# Patient Record
Sex: MALE | Race: OTHER | NOT HISPANIC OR LATINO | URBAN - METROPOLITAN AREA
[De-identification: names, ages, dates, MRNs, and addresses within clinical notes are randomized per-mention and may not be internally consistent; named-entity substitution may affect disease eponyms.]

---

## 2022-02-09 ENCOUNTER — EMERGENCY (EMERGENCY)
Facility: HOSPITAL | Age: 27
LOS: 0 days | Discharge: HOME | End: 2022-02-09
Attending: EMERGENCY MEDICINE | Admitting: EMERGENCY MEDICINE
Payer: COMMERCIAL

## 2022-02-09 VITALS
HEART RATE: 85 BPM | DIASTOLIC BLOOD PRESSURE: 71 MMHG | OXYGEN SATURATION: 99 % | SYSTOLIC BLOOD PRESSURE: 122 MMHG | RESPIRATION RATE: 16 BRPM

## 2022-02-09 VITALS
HEART RATE: 92 BPM | HEIGHT: 69 IN | TEMPERATURE: 97 F | SYSTOLIC BLOOD PRESSURE: 138 MMHG | DIASTOLIC BLOOD PRESSURE: 86 MMHG | WEIGHT: 179.9 LBS | RESPIRATION RATE: 18 BRPM | OXYGEN SATURATION: 99 %

## 2022-02-09 DIAGNOSIS — F43.20 ADJUSTMENT DISORDER, UNSPECIFIED: ICD-10-CM

## 2022-02-09 DIAGNOSIS — Z04.6 ENCOUNTER FOR GENERAL PSYCHIATRIC EXAMINATION, REQUESTED BY AUTHORITY: ICD-10-CM

## 2022-02-09 DIAGNOSIS — Z20.822 CONTACT WITH AND (SUSPECTED) EXPOSURE TO COVID-19: ICD-10-CM

## 2022-02-09 DIAGNOSIS — F32.9 MAJOR DEPRESSIVE DISORDER, SINGLE EPISODE, UNSPECIFIED: ICD-10-CM

## 2022-02-09 LAB
ALBUMIN SERPL ELPH-MCNC: 4.7 G/DL — SIGNIFICANT CHANGE UP (ref 3.5–5.2)
ALP SERPL-CCNC: 95 U/L — SIGNIFICANT CHANGE UP (ref 30–115)
ALT FLD-CCNC: 55 U/L — HIGH (ref 0–41)
ANION GAP SERPL CALC-SCNC: 10 MMOL/L — SIGNIFICANT CHANGE UP (ref 7–14)
APAP SERPL-MCNC: <5 UG/ML — LOW (ref 10–30)
APPEARANCE UR: CLEAR — SIGNIFICANT CHANGE UP
AST SERPL-CCNC: 30 U/L — SIGNIFICANT CHANGE UP (ref 0–41)
BASOPHILS # BLD AUTO: 0.02 K/UL — SIGNIFICANT CHANGE UP (ref 0–0.2)
BASOPHILS NFR BLD AUTO: 0.4 % — SIGNIFICANT CHANGE UP (ref 0–1)
BILIRUB SERPL-MCNC: 0.2 MG/DL — SIGNIFICANT CHANGE UP (ref 0.2–1.2)
BILIRUB UR-MCNC: NEGATIVE — SIGNIFICANT CHANGE UP
BUN SERPL-MCNC: 11 MG/DL — SIGNIFICANT CHANGE UP (ref 10–20)
CALCIUM SERPL-MCNC: 9.6 MG/DL — SIGNIFICANT CHANGE UP (ref 8.5–10.1)
CHLORIDE SERPL-SCNC: 104 MMOL/L — SIGNIFICANT CHANGE UP (ref 98–110)
CO2 SERPL-SCNC: 26 MMOL/L — SIGNIFICANT CHANGE UP (ref 17–32)
COLOR SPEC: YELLOW — SIGNIFICANT CHANGE UP
CREAT SERPL-MCNC: 1 MG/DL — SIGNIFICANT CHANGE UP (ref 0.7–1.5)
DIFF PNL FLD: NEGATIVE — SIGNIFICANT CHANGE UP
EOSINOPHIL # BLD AUTO: 0.13 K/UL — SIGNIFICANT CHANGE UP (ref 0–0.7)
EOSINOPHIL NFR BLD AUTO: 2.4 % — SIGNIFICANT CHANGE UP (ref 0–8)
ETHANOL SERPL-MCNC: <10 MG/DL — SIGNIFICANT CHANGE UP
GLUCOSE SERPL-MCNC: 90 MG/DL — SIGNIFICANT CHANGE UP (ref 70–99)
GLUCOSE UR QL: NEGATIVE MG/DL — SIGNIFICANT CHANGE UP
HCT VFR BLD CALC: 45.5 % — SIGNIFICANT CHANGE UP (ref 42–52)
HGB BLD-MCNC: 15.2 G/DL — SIGNIFICANT CHANGE UP (ref 14–18)
IMM GRANULOCYTES NFR BLD AUTO: 0.2 % — SIGNIFICANT CHANGE UP (ref 0.1–0.3)
KETONES UR-MCNC: NEGATIVE — SIGNIFICANT CHANGE UP
LEUKOCYTE ESTERASE UR-ACNC: NEGATIVE — SIGNIFICANT CHANGE UP
LYMPHOCYTES # BLD AUTO: 1.92 K/UL — SIGNIFICANT CHANGE UP (ref 1.2–3.4)
LYMPHOCYTES # BLD AUTO: 36 % — SIGNIFICANT CHANGE UP (ref 20.5–51.1)
MCHC RBC-ENTMCNC: 29.1 PG — SIGNIFICANT CHANGE UP (ref 27–31)
MCHC RBC-ENTMCNC: 33.4 G/DL — SIGNIFICANT CHANGE UP (ref 32–37)
MCV RBC AUTO: 87.2 FL — SIGNIFICANT CHANGE UP (ref 80–94)
MONOCYTES # BLD AUTO: 0.62 K/UL — HIGH (ref 0.1–0.6)
MONOCYTES NFR BLD AUTO: 11.6 % — HIGH (ref 1.7–9.3)
NEUTROPHILS # BLD AUTO: 2.63 K/UL — SIGNIFICANT CHANGE UP (ref 1.4–6.5)
NEUTROPHILS NFR BLD AUTO: 49.4 % — SIGNIFICANT CHANGE UP (ref 42.2–75.2)
NITRITE UR-MCNC: NEGATIVE — SIGNIFICANT CHANGE UP
NRBC # BLD: 0 /100 WBCS — SIGNIFICANT CHANGE UP (ref 0–0)
PH UR: 6.5 — SIGNIFICANT CHANGE UP (ref 5–8)
PLATELET # BLD AUTO: 264 K/UL — SIGNIFICANT CHANGE UP (ref 130–400)
POTASSIUM SERPL-MCNC: 4.5 MMOL/L — SIGNIFICANT CHANGE UP (ref 3.5–5)
POTASSIUM SERPL-SCNC: 4.5 MMOL/L — SIGNIFICANT CHANGE UP (ref 3.5–5)
PROT SERPL-MCNC: 7.5 G/DL — SIGNIFICANT CHANGE UP (ref 6–8)
PROT UR-MCNC: NEGATIVE MG/DL — SIGNIFICANT CHANGE UP
RBC # BLD: 5.22 M/UL — SIGNIFICANT CHANGE UP (ref 4.7–6.1)
RBC # FLD: 11.9 % — SIGNIFICANT CHANGE UP (ref 11.5–14.5)
SALICYLATES SERPL-MCNC: <0.3 MG/DL — LOW (ref 4–30)
SARS-COV-2 RNA SPEC QL NAA+PROBE: SIGNIFICANT CHANGE UP
SODIUM SERPL-SCNC: 140 MMOL/L — SIGNIFICANT CHANGE UP (ref 135–146)
SP GR SPEC: >=1.03 (ref 1.01–1.03)
UROBILINOGEN FLD QL: 0.2 MG/DL — SIGNIFICANT CHANGE UP
WBC # BLD: 5.33 K/UL — SIGNIFICANT CHANGE UP (ref 4.8–10.8)
WBC # FLD AUTO: 5.33 K/UL — SIGNIFICANT CHANGE UP (ref 4.8–10.8)

## 2022-02-09 PROCEDURE — 93010 ELECTROCARDIOGRAM REPORT: CPT

## 2022-02-09 PROCEDURE — 90792 PSYCH DIAG EVAL W/MED SRVCS: CPT | Mod: 95

## 2022-02-09 PROCEDURE — 99285 EMERGENCY DEPT VISIT HI MDM: CPT

## 2022-02-09 RX ORDER — HYDROXYZINE HCL 10 MG
1 TABLET ORAL
Qty: 10 | Refills: 0
Start: 2022-02-09 | End: 2022-02-18

## 2022-02-09 NOTE — ED PROVIDER NOTE - PROGRESS NOTE DETAILS
MATTHEW Schulz: I was directly involved in the management of this patient. Case was discussed with MATTHEW Swanson Spoke to Dr Torres in telepsych about pt. state he will be added to the list to be seen
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
## 2022-02-09 NOTE — ED PROVIDER NOTE - PATIENT PORTAL LINK FT
You can access the FollowMyHealth Patient Portal offered by [facility] by registering at the following website: [email or web address]. By joining [employer]’s FollowMyHealth portal, you will also be able to view your health information using other applications (apps) compatible with our system.

## 2022-02-09 NOTE — ED PROVIDER NOTE - NSFOLLOWUPINSTRUCTIONS_ED_ALL_ED_FT
Please follow up with referrals from telepsych. please take medications as prescribed    Depression    Depression is a mental illness that usually causes feelings of sadness, hopelessness, or helplessness. Some people with this disorder do not feel particularly sad but lose interest in doing things they used to enjoy (anhedonia). Major depressive disorder also can cause physical symptoms. It can interfere with work, school, relationships, and other normal everyday activities. If you were started on a medication make sure to take exactly as prescribed and follow up with a psychiatrist.    SEEK IMMEDIATE MEDICAL CARE IF YOU HAVE THE FOLLOWING SYMPTOMS: thoughts about hurting killing yourself, thoughts about hurting or killing somebody else, hallucinations, or worsening depression.

## 2022-02-09 NOTE — ED PROVIDER NOTE - PHYSICAL EXAMINATION
VITAL SIGNS: I have reviewed nursing notes and confirm.  CONSTITUTIONAL: Well-developed; well-nourished; in no acute distress.  SKIN: Skin exam is warm and dry, no acute rash.  HEAD: Normocephalic; atraumatic.  EYES: EOM intact; conjunctiva and sclera clear.  ENT: No nasal discharge; airway clear.   NECK: Supple; non tender.  CARD: S1, S2 normal; no murmurs, gallops, or rubs. Regular rate and rhythm.  RESP: No wheezes, rales or rhonchi. Speaking in full sentences.   ABD:  soft; non-distended; non-tender; No rebound or guarding.  EXT: Normal ROM. No clubbing, cyanosis or edema.  NEURO: Alert, oriented  PSYCH: Cooperative, appropriate.

## 2022-02-09 NOTE — ED PROVIDER NOTE - NSFOLLOWUPCLINICS_GEN_ALL_ED_FT
Advanced care planning was discussed with patient and family.  Advanced care planning forms were reviewed and discussed.  Risks, benefits and alternatives of gastroenterologic procedures were discussed in detail and all questions were answered.    30 minutes spent. [facility]  OP [facility]  [address] [place] [id number]  Phone: [phone]  Fax:   Follow Up Time: 1-3 Days
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
## 2022-02-09 NOTE — ED PROVIDER NOTE - NS ED ROS FT
Review of Systems  Constitutional:  No fever, chills, malaise, generalized weakness. denies FH of schizophrenia, depression, or bipolar  Eyes:  No visual changes, eye pain, or discharge.  Cardiac:  No chest pain, palpitations, syncope, or edema.  Respiratory:  No dyspnea  GI:  No nausea, vomiting, diarrhea, or abdominal pain.   :  No dysuria,  Skin:  No skin rash, pruritis, jaundice, or lesions.  Neuro:  + headache, - dizziness Review of Systems  Constitutional:  No fever, chills, malaise, generalized weakness. denies FH of schizophrenia, depression, or bipolar  Eyes:  No visual changes, eye pain, or discharge.  Cardiac:  No chest pain, palpitations, syncope, or edema.  Respiratory:  No dyspnea  GI:  No nausea, vomiting, diarrhea, or abdominal pain.   :  No dysuria,  Skin:  No skin rash, pruritis, jaundice, or lesions.  Neuro:  + headache, - dizziness  PSYCH: + SI

## 2022-02-09 NOTE — ED BEHAVIORAL HEALTH ASSESSMENT NOTE - RISK ASSESSMENT
chronic rf: none  acute rf: marital stressors, poor sleep, appetite, depressive symptoms  protective f: no pphx, support, no SA hx Low Acute Suicide Risk
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
## 2022-02-09 NOTE — ED ADULT NURSE NOTE - NSIMPLEMENTINTERV_GEN_ALL_ED
Implemented All Universal Safety Interventions:  [place] to call system. Call bell, personal items and telephone within reach. Instruct patient to call for assistance. Room bathroom lighting operational. Non-slip footwear when patient is off stretcher. Physically safe environment: no spills, clutter or unnecessary equipment. Stretcher in lowest position, wheels locked, appropriate side rails in place.

## 2022-02-09 NOTE — ED BEHAVIORAL HEALTH ASSESSMENT NOTE - SUMMARY
25 yo male,  approximately 1.5yrs, originally from [place], [place], fully employed as a [employer] kir[employer] , non-caregiver/no children, no PMHx, no PPHx, no hx of inpatient psychiatric hospitalizations, no hx of si/hi, no hx of substance abuse, no hx of legal issues, no hx of DV, presents to the ED with poor sleep, appetite, unable to work in the setting of marital issues. Upon assessment today, patient appears to endorse some depressive symptoms including poor sleep, appetite, inability to work and marital stressors. Patient denies any SI/HI/AVH patient does not meet criteria for inpatient psych admission. will be provided with referrals for establishing outpatient psych care.

## 2022-02-09 NOTE — ED PROVIDER NOTE - CLINICAL SUMMARY MEDICAL DECISION MAKING FREE TEXT BOX
Patient medically cleared for psych evaluation. Patient seen by telemetry psych and deemed safe for discharge home. Patient given Rx for Vistaril. will follow-up with outpatient mental health.

## 2022-02-09 NOTE — ED ADULT NURSE REASSESSMENT NOTE - NS ED NURSE REASSESS COMMENT FT1
Pt alert and oriented X4 discharge in stable condition accompanied by friend, no suicidal thoughts, pt clean and comfortable with eye contact.

## 2022-02-24 PROBLEM — Z78.9 OTHER SPECIFIED HEALTH STATUS: Chronic | Status: ACTIVE | Noted: 2022-02-09

## 2022-03-08 ENCOUNTER — OUTPATIENT (OUTPATIENT)
Dept: OUTPATIENT SERVICES | Facility: HOSPITAL | Age: 27
LOS: 1 days | Discharge: HOME | End: 2022-03-08

## 2022-03-08 ENCOUNTER — APPOINTMENT (OUTPATIENT)
Dept: PSYCHIATRY | Facility: CLINIC | Age: 27
End: 2022-03-08

## 2022-03-08 DIAGNOSIS — F43.23 ADJUSTMENT DISORDER WITH MIXED ANXIETY AND DEPRESSED MOOD: ICD-10-CM

## 2022-03-08 PROBLEM — Z00.00 ENCOUNTER FOR PREVENTIVE HEALTH EXAMINATION: Status: ACTIVE | Noted: 2022-03-08

## 2022-03-25 ENCOUNTER — APPOINTMENT (OUTPATIENT)
Dept: PSYCHIATRY | Facility: CLINIC | Age: 27
End: 2022-03-25
Payer: COMMERCIAL

## 2022-03-25 ENCOUNTER — OUTPATIENT (OUTPATIENT)
Dept: OUTPATIENT SERVICES | Facility: HOSPITAL | Age: 27
LOS: 1 days | Discharge: HOME | End: 2022-03-25

## 2022-03-25 VITALS — WEIGHT: 196.63 LBS | HEIGHT: 69 IN | BODY MASS INDEX: 29.12 KG/M2

## 2022-03-25 DIAGNOSIS — F41.1 GENERALIZED ANXIETY DISORDER: ICD-10-CM

## 2022-03-25 DIAGNOSIS — F32.1 MAJOR DEPRESSIVE DISORDER, SINGLE EPISODE, MODERATE: ICD-10-CM

## 2022-03-25 PROCEDURE — 90792 PSYCH DIAG EVAL W/MED SRVCS: CPT

## 2022-04-01 ENCOUNTER — NON-APPOINTMENT (OUTPATIENT)
Age: 27
End: 2022-04-01

## 2022-04-07 VITALS — WEIGHT: 190 LBS | HEIGHT: 69 IN | BODY MASS INDEX: 28.14 KG/M2

## 2022-04-08 ENCOUNTER — OUTPATIENT (OUTPATIENT)
Dept: OUTPATIENT SERVICES | Facility: HOSPITAL | Age: 27
LOS: 1 days | Discharge: HOME | End: 2022-04-08

## 2022-04-08 ENCOUNTER — APPOINTMENT (OUTPATIENT)
Dept: PSYCHIATRY | Facility: CLINIC | Age: 27
End: 2022-04-08
Payer: COMMERCIAL

## 2022-04-08 VITALS — WEIGHT: 194 LBS | HEIGHT: 69 IN | BODY MASS INDEX: 28.73 KG/M2

## 2022-04-08 DIAGNOSIS — F32.2 MAJOR DEPRESSIVE DISORDER, SINGLE EPISODE, SEVERE WITHOUT PSYCHOTIC FEATURES: ICD-10-CM

## 2022-04-08 PROCEDURE — 99213 OFFICE O/P EST LOW 20 MIN: CPT

## 2022-04-08 RX ORDER — MIRTAZAPINE 7.5 MG/1
7.5 TABLET, FILM COATED ORAL AT BEDTIME
Qty: 30 | Refills: 0 | Status: DISCONTINUED | COMMUNITY
Start: 2022-03-25 | End: 2022-04-08

## 2022-04-12 ENCOUNTER — APPOINTMENT (OUTPATIENT)
Dept: PSYCHIATRY | Facility: CLINIC | Age: 27
End: 2022-04-12

## 2022-04-12 ENCOUNTER — OUTPATIENT (OUTPATIENT)
Dept: OUTPATIENT SERVICES | Facility: HOSPITAL | Age: 27
LOS: 1 days | Discharge: HOME | End: 2022-04-12

## 2022-04-12 DIAGNOSIS — F32.2 MAJOR DEPRESSIVE DISORDER, SINGLE EPISODE, SEVERE WITHOUT PSYCHOTIC FEATURES: ICD-10-CM

## 2022-04-25 ENCOUNTER — OUTPATIENT (OUTPATIENT)
Dept: OUTPATIENT SERVICES | Facility: HOSPITAL | Age: 27
LOS: 1 days | Discharge: HOME | End: 2022-04-25

## 2022-04-25 ENCOUNTER — APPOINTMENT (OUTPATIENT)
Dept: PSYCHIATRY | Facility: CLINIC | Age: 27
End: 2022-04-25

## 2022-04-25 DIAGNOSIS — F32.2 MAJOR DEPRESSIVE DISORDER, SINGLE EPISODE, SEVERE WITHOUT PSYCHOTIC FEATURES: ICD-10-CM

## 2022-04-29 ENCOUNTER — OUTPATIENT (OUTPATIENT)
Dept: OUTPATIENT SERVICES | Facility: HOSPITAL | Age: 27
LOS: 1 days | Discharge: HOME | End: 2022-04-29

## 2022-04-29 ENCOUNTER — APPOINTMENT (OUTPATIENT)
Dept: PSYCHIATRY | Facility: CLINIC | Age: 27
End: 2022-04-29
Payer: COMMERCIAL

## 2022-04-29 VITALS — BODY MASS INDEX: 27.97 KG/M2 | WEIGHT: 188.88 LBS | HEIGHT: 69 IN

## 2022-04-29 DIAGNOSIS — F32.2 MAJOR DEPRESSIVE DISORDER, SINGLE EPISODE, SEVERE WITHOUT PSYCHOTIC FEATURES: ICD-10-CM

## 2022-04-29 PROCEDURE — 99213 OFFICE O/P EST LOW 20 MIN: CPT

## 2022-05-09 ENCOUNTER — APPOINTMENT (OUTPATIENT)
Dept: PSYCHIATRY | Facility: CLINIC | Age: 27
End: 2022-05-09

## 2022-05-17 ENCOUNTER — OUTPATIENT (OUTPATIENT)
Dept: OUTPATIENT SERVICES | Facility: HOSPITAL | Age: 27
LOS: 1 days | Discharge: HOME | End: 2022-05-17

## 2022-05-17 ENCOUNTER — APPOINTMENT (OUTPATIENT)
Dept: PSYCHIATRY | Facility: CLINIC | Age: 27
End: 2022-05-17
Payer: COMMERCIAL

## 2022-05-17 VITALS — WEIGHT: 187.63 LBS | HEIGHT: 69 IN | BODY MASS INDEX: 27.79 KG/M2

## 2022-05-17 DIAGNOSIS — F32.2 MAJOR DEPRESSIVE DISORDER, SINGLE EPISODE, SEVERE WITHOUT PSYCHOTIC FEATURES: ICD-10-CM

## 2022-05-17 PROCEDURE — 99213 OFFICE O/P EST LOW 20 MIN: CPT

## 2022-05-23 ENCOUNTER — OUTPATIENT (OUTPATIENT)
Dept: OUTPATIENT SERVICES | Facility: HOSPITAL | Age: 27
LOS: 1 days | Discharge: HOME | End: 2022-05-23

## 2022-05-23 ENCOUNTER — APPOINTMENT (OUTPATIENT)
Dept: PSYCHIATRY | Facility: CLINIC | Age: 27
End: 2022-05-23

## 2022-05-23 DIAGNOSIS — F32.2 MAJOR DEPRESSIVE DISORDER, SINGLE EPISODE, SEVERE WITHOUT PSYCHOTIC FEATURES: ICD-10-CM

## 2022-06-08 ENCOUNTER — APPOINTMENT (OUTPATIENT)
Dept: PSYCHIATRY | Facility: CLINIC | Age: 27
End: 2022-06-08
Payer: COMMERCIAL

## 2022-06-08 ENCOUNTER — OUTPATIENT (OUTPATIENT)
Dept: OUTPATIENT SERVICES | Facility: HOSPITAL | Age: 27
LOS: 1 days | Discharge: HOME | End: 2022-06-08

## 2022-06-08 VITALS — WEIGHT: 185.75 LBS | HEIGHT: 69 IN | BODY MASS INDEX: 27.51 KG/M2

## 2022-06-08 DIAGNOSIS — F32.2 MAJOR DEPRESSIVE DISORDER, SINGLE EPISODE, SEVERE WITHOUT PSYCHOTIC FEATURES: ICD-10-CM

## 2022-06-08 PROCEDURE — 99213 OFFICE O/P EST LOW 20 MIN: CPT

## 2022-06-24 ENCOUNTER — APPOINTMENT (OUTPATIENT)
Dept: PSYCHIATRY | Facility: CLINIC | Age: 27
End: 2022-06-24

## 2022-07-08 ENCOUNTER — APPOINTMENT (OUTPATIENT)
Dept: PSYCHIATRY | Facility: CLINIC | Age: 27
End: 2022-07-08

## 2022-07-08 ENCOUNTER — OUTPATIENT (OUTPATIENT)
Dept: OUTPATIENT SERVICES | Facility: HOSPITAL | Age: 27
LOS: 1 days | Discharge: HOME | End: 2022-07-08

## 2022-07-08 VITALS — WEIGHT: 180.75 LBS | HEIGHT: 69 IN | BODY MASS INDEX: 26.77 KG/M2

## 2022-07-08 DIAGNOSIS — F32.2 MAJOR DEPRESSIVE DISORDER, SINGLE EPISODE, SEVERE WITHOUT PSYCHOTIC FEATURES: ICD-10-CM

## 2022-07-08 PROCEDURE — 99213 OFFICE O/P EST LOW 20 MIN: CPT

## 2022-07-08 RX ORDER — ATORVASTATIN CALCIUM 20 MG/1
20 TABLET, FILM COATED ORAL
Qty: 30 | Refills: 0 | Status: ACTIVE | COMMUNITY
Start: 2022-06-30

## 2022-08-05 ENCOUNTER — APPOINTMENT (OUTPATIENT)
Dept: PSYCHIATRY | Facility: CLINIC | Age: 27
End: 2022-08-05

## 2022-08-05 ENCOUNTER — OUTPATIENT (OUTPATIENT)
Dept: OUTPATIENT SERVICES | Facility: HOSPITAL | Age: 27
LOS: 1 days | Discharge: HOME | End: 2022-08-05

## 2022-08-05 VITALS — HEIGHT: 69 IN | BODY MASS INDEX: 25.82 KG/M2 | WEIGHT: 174.31 LBS

## 2022-08-05 DIAGNOSIS — F32.2 MAJOR DEPRESSIVE DISORDER, SINGLE EPISODE, SEVERE WITHOUT PSYCHOTIC FEATURES: ICD-10-CM

## 2022-08-05 PROCEDURE — 99213 OFFICE O/P EST LOW 20 MIN: CPT

## 2022-09-23 ENCOUNTER — OUTPATIENT (OUTPATIENT)
Dept: OUTPATIENT SERVICES | Facility: HOSPITAL | Age: 27
LOS: 1 days | Discharge: HOME | End: 2022-09-23

## 2022-09-23 ENCOUNTER — APPOINTMENT (OUTPATIENT)
Dept: PSYCHIATRY | Facility: CLINIC | Age: 27
End: 2022-09-23

## 2022-09-23 DIAGNOSIS — F32.2 MAJOR DEPRESSIVE DISORDER, SINGLE EPISODE, SEVERE WITHOUT PSYCHOTIC FEATURES: ICD-10-CM

## 2022-10-04 ENCOUNTER — APPOINTMENT (OUTPATIENT)
Dept: PSYCHIATRY | Facility: CLINIC | Age: 27
End: 2022-10-04

## 2022-10-04 ENCOUNTER — OUTPATIENT (OUTPATIENT)
Dept: OUTPATIENT SERVICES | Facility: HOSPITAL | Age: 27
LOS: 1 days | Discharge: HOME | End: 2022-10-04

## 2022-10-04 DIAGNOSIS — F32.2 MAJOR DEPRESSIVE DISORDER, SINGLE EPISODE, SEVERE WITHOUT PSYCHOTIC FEATURES: ICD-10-CM

## 2022-10-04 DIAGNOSIS — F51.05 INSOMNIA DUE TO OTHER MENTAL DISORDER: ICD-10-CM

## 2022-10-04 DIAGNOSIS — F43.22 ADJUSTMENT DISORDER WITH ANXIETY: ICD-10-CM

## 2022-10-04 PROCEDURE — 99214 OFFICE O/P EST MOD 30 MIN: CPT

## 2022-10-04 NOTE — REVIEW OF SYSTEMS
[Negative] : Allergic/Immunologic [FreeTextEntry2] : fatigue [FreeTextEntry7] : occasional diarrhea [de-identified] : occasional headaches

## 2022-10-04 NOTE — RESULTS/DATA
[FreeTextEntry1] : 4/16/22\par \par FBS 96\par HBA1C 5.5\par \par Bun 23, otherwise CMP WNL\par \par TSH 1.2\par \par Chol 235\par Tri  396\par HDL 27\par \par \par B12 208\par \par Elevated cholesterol and low B12 discussed with patient. .  He was provided a copy to bring to PMD.  He reports prior history of statin treatment.

## 2022-10-04 NOTE — PHYSICAL EXAM
[None] : none [Appropriate] : appropriate [Oriented] : oriented [Normal] : good [Adeq for basic needs] : adequate for basic needs [Anxious] : anxious [FreeTextEntry1] : neatly groomed, well-dressed, anxious [FreeTextEntry7] : transient passive  suicidal ideation [FreeTextEntry8] : less anxious, slightly brighter [de-identified] : impaired
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
## 2022-11-14 ENCOUNTER — APPOINTMENT (OUTPATIENT)
Dept: PSYCHIATRY | Facility: CLINIC | Age: 27
End: 2022-11-14

## 2022-11-14 NOTE — DISCUSSION/SUMMARY
[FreeTextEntry1] : Pt is a no show.\par I called him. He thought that his appt is at 2pm (not at 12pm). Pt doesn't know how to use telepsych and can do only in person visits. Pt will call our  to reschedule

## 2022-11-25 ENCOUNTER — APPOINTMENT (OUTPATIENT)
Dept: PSYCHIATRY | Facility: CLINIC | Age: 27
End: 2022-11-25

## 2022-11-25 ENCOUNTER — OUTPATIENT (OUTPATIENT)
Dept: OUTPATIENT SERVICES | Facility: HOSPITAL | Age: 27
LOS: 1 days | Discharge: HOME | End: 2022-11-25

## 2022-11-25 DIAGNOSIS — F51.05 INSOMNIA DUE TO OTHER MENTAL DISORDER: ICD-10-CM

## 2022-11-25 DIAGNOSIS — F43.22 ADJUSTMENT DISORDER WITH ANXIETY: ICD-10-CM

## 2022-11-25 DIAGNOSIS — F32.2 MAJOR DEPRESSIVE DISORDER, SINGLE EPISODE, SEVERE WITHOUT PSYCHOTIC FEATURES: ICD-10-CM

## 2022-11-25 PROCEDURE — 99214 OFFICE O/P EST MOD 30 MIN: CPT

## 2022-11-25 NOTE — REVIEW OF SYSTEMS
[Negative] : Allergic/Immunologic [FreeTextEntry2] : fatigue [FreeTextEntry7] : occasional diarrhea [de-identified] : occasional headaches
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
## 2022-11-25 NOTE — HISTORY OF PRESENT ILLNESS
[de-identified] : Pt came in person for his visit.\par Pt says that he is doing better and meds work. Tolerates them well. Pt didn't have any psych history before his problems with wife (cheating on him, separation, etc)started. Pt still lives with his friends in [place]. 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 Spoke again about his ex. \par We also spoke about his symptoms. Pt denies AH at this time. However, he acknowledges to having AH sometimes (his ex's voice, talking to him). He feels better, though still misses her at times. Denies SI/HI/AH/PI. Denies drugs and ETOH. C/w psychotherapy was encouraged. I emailed Fatoumata-pt would benefit a lot. \par Pt is going to see his PCP and to have labs done again (he has mild ALT and LDL elevation). Healthy life style and diet were d/w pt. The risks of zyprexa were also discussed. I will not change meds this time. I gave pt my card with our fax #. \par \par  [No] : no [Yes] : yes [None Known] : none known [Mood episode] : mood episode [Recent humiliation/shame] : recent humiliation/shame [Responsibility to family or others] : responsibility to family or others [Supportive social network or family] : supportive social network or family [High spirituality] : high spirituality [Other___] : [unfilled]

## 2022-11-25 NOTE — PHYSICAL EXAM
[None] : none [Anxious] : anxious [Appropriate] : appropriate [Oriented] : oriented [Normal] : good [Adeq for basic needs] : adequate for basic needs [FreeTextEntry1] : neatly groomed, well-dressed, anxious [FreeTextEntry7] : transient passive  suicidal ideation [FreeTextEntry8] : less anxious, slightly brighter [de-identified] : impaired
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
## 2022-11-28 ENCOUNTER — NON-APPOINTMENT (OUTPATIENT)
Age: 27
End: 2022-11-28

## 2022-12-09 ENCOUNTER — OUTPATIENT (OUTPATIENT)
Dept: OUTPATIENT SERVICES | Facility: HOSPITAL | Age: 27
LOS: 1 days | Discharge: HOME | End: 2022-12-09

## 2022-12-09 ENCOUNTER — NON-APPOINTMENT (OUTPATIENT)
Age: 27
End: 2022-12-09

## 2022-12-09 ENCOUNTER — APPOINTMENT (OUTPATIENT)
Dept: PSYCHIATRY | Facility: CLINIC | Age: 27
End: 2022-12-09

## 2022-12-09 DIAGNOSIS — F43.22 ADJUSTMENT DISORDER WITH ANXIETY: ICD-10-CM

## 2022-12-09 DIAGNOSIS — F32.2 MAJOR DEPRESSIVE DISORDER, SINGLE EPISODE, SEVERE WITHOUT PSYCHOTIC FEATURES: ICD-10-CM

## 2022-12-09 DIAGNOSIS — F51.05 INSOMNIA DUE TO OTHER MENTAL DISORDER: ICD-10-CM

## 2022-12-09 NOTE — REASON FOR VISIT
[Patient] : Patient [FreeTextEntry1] : Patient is a 27 year old male being seen for a followup therapy session

## 2022-12-09 NOTE — PLAN
[Supportive Therapy] : Supportive Therapy [FreeTextEntry2] : Goal #1 Reduce depressive symptoms so patient can return to effective functioning. \par \par Obj#1 New.. Patient will engage in physical activity and exercise daily. \par Obj#2 Revised.. Patient will use daily positive coping and distraction methods. . \par  [de-identified] : Patient reports that his psychiatrist is referring him back to therapy since his mood has been a little low related to upcoming anniversary he proposed to his wife and one year anniversary of her leaving him. He denies any self-harm thoughts but sad thoughts of what happened and loss of his marriage which interfere with his sleep. He reports medication compliance and that there have not been any changes in his medication since last session. He reports going for blood work and will be following up with PCP. He will speak to PCP about medication for sleep.  He reports that he has still be living in [place] with family friends. He plans to visit family update for the holidays. He continues to work with  who is helping him with divorce process and immigration benefits. He reports that he has been functioning at work and feels good to be working but it has been slow at his job which is not helpful for his mood and fiances. Explored positive coping methods. He reports listening to music and reading is helpful to distract his sad thoughts. He has not been consistent with exercise since he likes outdoor exercise and it has been cold but he will make effort to go out for walks or runs. He would like to followup with next session on the same day of his appointment with psychiatrist,after the holidays, on 1/6/23.  [Recommended Frequency of Visits: ____] : Recommended frequency of visits: [unfilled] [Return in ____ week(s)] : Return in [unfilled] week(s) [FreeTextEntry1] : Patient will focus on positive coping methods, continue medication regimen, followup PCP, and followup with psychiatrist and therapy on 1/6/23

## 2023-01-04 ENCOUNTER — NON-APPOINTMENT (OUTPATIENT)
Age: 28
End: 2023-01-04

## 2023-01-06 ENCOUNTER — APPOINTMENT (OUTPATIENT)
Dept: PSYCHIATRY | Facility: CLINIC | Age: 28
End: 2023-01-06

## 2023-01-23 ENCOUNTER — OUTPATIENT (OUTPATIENT)
Dept: OUTPATIENT SERVICES | Facility: HOSPITAL | Age: 28
LOS: 1 days | Discharge: HOME | End: 2023-01-23

## 2023-01-23 ENCOUNTER — APPOINTMENT (OUTPATIENT)
Dept: PSYCHIATRY | Facility: CLINIC | Age: 28
End: 2023-01-23

## 2023-01-23 DIAGNOSIS — F43.22 ADJUSTMENT DISORDER WITH ANXIETY: ICD-10-CM

## 2023-01-23 DIAGNOSIS — F32.2 MAJOR DEPRESSIVE DISORDER, SINGLE EPISODE, SEVERE WITHOUT PSYCHOTIC FEATURES: ICD-10-CM

## 2023-01-23 DIAGNOSIS — F51.05 INSOMNIA DUE TO OTHER MENTAL DISORDER: ICD-10-CM

## 2023-01-24 ENCOUNTER — OUTPATIENT (OUTPATIENT)
Dept: OUTPATIENT SERVICES | Facility: HOSPITAL | Age: 28
LOS: 1 days | Discharge: HOME | End: 2023-01-24

## 2023-01-24 ENCOUNTER — APPOINTMENT (OUTPATIENT)
Dept: PSYCHIATRY | Facility: CLINIC | Age: 28
End: 2023-01-24
Payer: COMMERCIAL

## 2023-01-24 DIAGNOSIS — F43.22 ADJUSTMENT DISORDER WITH ANXIETY: ICD-10-CM

## 2023-01-24 DIAGNOSIS — F51.05 INSOMNIA DUE TO OTHER MENTAL DISORDER: ICD-10-CM

## 2023-01-24 DIAGNOSIS — F32.2 MAJOR DEPRESSIVE DISORDER, SINGLE EPISODE, SEVERE WITHOUT PSYCHOTIC FEATURES: ICD-10-CM

## 2023-01-24 PROCEDURE — 99214 OFFICE O/P EST MOD 30 MIN: CPT

## 2023-01-24 NOTE — PHYSICAL EXAM
[None] : none [Anxious] : anxious [Appropriate] : appropriate [Oriented] : oriented [Normal] : good [Adeq for basic needs] : adequate for basic needs [FreeTextEntry1] : neatly groomed, well-dressed, anxious [FreeTextEntry7] : transient passive  suicidal ideation [FreeTextEntry8] : less anxious, slightly brighter [de-identified] : impaired

## 2023-01-24 NOTE — REVIEW OF SYSTEMS
[Negative] : Allergic/Immunologic [FreeTextEntry2] : fatigue [FreeTextEntry7] : occasional diarrhea [de-identified] : occasional headaches
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
## 2023-02-14 ENCOUNTER — NON-APPOINTMENT (OUTPATIENT)
Age: 28
End: 2023-02-14

## 2023-02-27 ENCOUNTER — APPOINTMENT (OUTPATIENT)
Dept: PSYCHIATRY | Facility: CLINIC | Age: 28
End: 2023-02-27
Payer: COMMERCIAL

## 2023-02-27 ENCOUNTER — OUTPATIENT (OUTPATIENT)
Dept: OUTPATIENT SERVICES | Facility: HOSPITAL | Age: 28
LOS: 1 days | End: 2023-02-27
Payer: COMMERCIAL

## 2023-02-27 ENCOUNTER — APPOINTMENT (OUTPATIENT)
Dept: PSYCHIATRY | Facility: CLINIC | Age: 28
End: 2023-02-27

## 2023-02-27 DIAGNOSIS — F60.9 PERSONALITY DISORDER, UNSPECIFIED: ICD-10-CM

## 2023-02-27 DIAGNOSIS — F33.1 MAJOR DEPRESSIVE DISORDER, RECURRENT, MODERATE: ICD-10-CM

## 2023-02-27 DIAGNOSIS — F51.05 INSOMNIA DUE TO OTHER MENTAL DISORDER: ICD-10-CM

## 2023-02-27 DIAGNOSIS — F41.1 GENERALIZED ANXIETY DISORDER: ICD-10-CM

## 2023-02-27 DIAGNOSIS — F32.2 MAJOR DEPRESSIVE DISORDER, SINGLE EPISODE, SEVERE WITHOUT PSYCHOTIC FEATURES: ICD-10-CM

## 2023-02-27 DIAGNOSIS — F43.22 ADJUSTMENT DISORDER WITH ANXIETY: ICD-10-CM

## 2023-02-27 PROCEDURE — 99214 OFFICE O/P EST MOD 30 MIN: CPT

## 2023-02-27 PROCEDURE — 99214 OFFICE O/P EST MOD 30 MIN: CPT | Mod: 25,95

## 2023-02-27 PROCEDURE — 90832 PSYTX W PT 30 MINUTES: CPT

## 2023-02-27 NOTE — REVIEW OF SYSTEMS
[Negative] : Allergic/Immunologic [FreeTextEntry2] : fatigue [FreeTextEntry7] : occasional diarrhea [de-identified] : occasional headaches

## 2023-02-27 NOTE — PHYSICAL EXAM
[None] : none [Anxious] : no anxious [Appropriate] : appropriate [Oriented] : oriented [Normal] : good [Adeq for basic needs] : adequate for basic needs [FreeTextEntry1] : neatly groomed, well-dressed, anxious [FreeTextEntry7] : transient passive  suicidal ideation [FreeTextEntry8] : less depressed [de-identified] : impaired

## 2023-02-27 NOTE — HISTORY OF PRESENT ILLNESS
[de-identified] : Pt came in person for his visit.\par Pt says that he is doing better and meds work. Tolerates them well. Feels better.Pt started working more, making more money and works hard. He is going to [place] today (will drive for 3 days and tolerates it well). Pt gained weight, but says that this doesn't bother him since he had lost more than 35 lbs due to severe depression. Pt already decreased zyprexa to 2.5mg and feels good. We will continue this dose for several more months and then will try to stop it.Pt sleeps well and doesn't have any perceptual disturbances.\par  Pt didn't have any psych history before his problems with wife (cheating on him, separation, etc)started.  Spoke again about his ex. \par  Denies SI/HI/AH/PI. Denies drugs and ETOH. C/w psychotherapy\par I received pt's labs (not scanned yet). Lipids are elevated. Healthy diet and life style were d/w pt.\par His citizenship case is coming along well. [No] : no [Yes] : yes [None Known] : none known [Mood episode] : mood episode [Recent humiliation/shame] : recent humiliation/shame [Responsibility to family or others] : responsibility to family or others [Supportive social network or family] : supportive social network or family [High spirituality] : high spirituality [Other___] : [unfilled]
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
## 2023-02-28 NOTE — REASON FOR VISIT
[Patient] : Patient [FreeTextEntry1] : Patient is a 27 year old make been for a followup therapy session, 11:15 to 11:35 AM. COVID screening is negative.

## 2023-02-28 NOTE — PLAN
[Supportive Therapy] : Supportive Therapy [Recommended Frequency of Visits: ____] : Recommended frequency of visits: [unfilled] [FreeTextEntry2] : Goal #1 Reduce depressive symptoms so patient can return to effective functioning. \par \par Obj#1  Patient will engage in physical activity and exercise daily. \par Obj#2  Patient will use daily positive coping and distraction methods. . \par  [de-identified] : Patient reports that he has gotten through the one year anniversary of his wife leaving. He reports that he is feeling much better and has made progress since last year. He reports that he saw his psychiatrist today and may be ready at next visit with her, on April 7, to start reduce his medication dosage. He reports that he has been active with work and exercise. He reports that he is sleeping well and that having a constructive daily routine helps his self-esteem and distract himself from any worry thoughts. He reports that, overall, his thoughts have been positive. He reports feeling well physically and still has a PCP in [place] whom he followup with at least once a year. He is reports functioning well and enjoying his job which requires him to travel with different states. He reports that he returned home from working 20 days, and will be leaving today to [place] for his job. He reports that he is still staying in [place] with family friends. He reports contact with his family in [place]. Renetta to difficulty with his work schedule and feeling stable, he does not need to continue therapy sessions at this time but will continue Medication management sessions with psychiatrist.  [FreeTextEntry1] : Patient will continue use of daily routine of constructive activity. He will continue medication regimen and followup with medication management on 4/7. He will return to therapy sessions in the future if needed.

## 2023-03-01 NOTE — DISCUSSION/SUMMARY
[Plan Review] : Plan Review [Able to exercise self-direction] : able to exercise self-direction [Able to set and pursue goals] : able to set and pursue goals [Adherent to treatment recommendations] : adherent to treatment recommendations [Articulate] : articulate [Attempting to realize their potential] : Attempting to realize their potential [Cognitively intact] : cognitively intact [Motivated to participate in treatment] : motivated to participate in treatment [Motivated to maintain or improve physical health] : motivated to maintain or improve physical health [In good health] : in good health [Has vocational interests or hobbies] : has vocational interests or hobbies [Part of a supportive family] : part of a supportive family [Steady employment] : steady employment [Connected to healthcare] : connected to healthcare [FreeTextEntry2] : 12/7/23 [FreeTextEntry3] : 3/25/22 [FreeTextEntry8] : None  [FreeTextEntry9] : None  [de-identified] : None  [Mental Health] : Mental Health [Revised - Rationale] : Revised - Rationale: [every ___ months] : every [unfilled] months [Treatment is no longer medically necessary as evidenced by:] : Treatment is no longer medically necessary as evidenced by: [None - Reason others did not participate:] : None - Reason others did not participate:  [Yes] : Yes [Psychiatric Provider/Prescriber] : Psychiatric Provider/Prescriber [Therapist] : Therapist [FreeTextEntry1] : Patient started treatment March of 2022 due to becoming very depressed after wife left him. His mood has been impacted by the upcoming one year anniversary of his wife leaving him.  [FreeTextEntry4] : " I want to get through the one year anniversary of my wife leaving"  " I want to return to effective functioning"  [de-identified] : Patient was been seen for medication management only since his depressive and anxiety symptoms had significantly improved but patient is now having some symptoms due to upcoming one year anniversary of wife leaving him.  [de-identified] : Patient will engage in physical activity and exercise daily.  [de-identified] : 12/7/23 [de-identified] : Patient has been working a job out of state. Patient reports that keeping active and having a daily structure of constructive activity has been helpful.  [de-identified] : Patient will use daily positive coping and distraction methods..  [de-identified] : 12/7/23 [de-identified] : Patient has been having more  thoughts related to loss of his relationship with wife causing some mood disturbance.  [FreeTextEntry5] : [place]  and Supportive therapy  [de-identified] : Due to job schedule and traveling out of state for work, patient has difficulty keeping with having sessions more than once a month.  [de-identified] : Patient can function at baseline without therapy or medication management, or patient can be seen by PCP or other provider in the community for medication management.   [de-identified] : Not clinically indicated

## 2023-03-01 NOTE — PLAN
From: Lilli Calvo  To: López Luevano,   Sent: 3/4/2020 1:56 PM CST  Subject: Lab Test or Test Related Question    I have a question about LIPID PANEL WITH REFLEX resulted on 2/17/20, 3:04 PM.    So what are the next steps that can / should be taken based on these numbers being above normal?    Lilli Calvo   [[facility]] : [facility]  [Supportive Therapy] : Supportive Therapy [Recommended Frequency of Visits: ____] : Recommended frequency of visits: [unfilled] [Return in ____ week(s)] : Return in [unfilled] week(s) [FreeTextEntry2] : Goal #1 Reduce depressive symptoms so patient can return to effective functioning. \par \par Obj#1  Patient will engage in physical activity and exercise daily. \par Obj#2  Patient will use daily positive coping and distraction methods. [de-identified] : Patient reports that he is overall doing better than when he started treatment. He reports that getting back to work has been helpful to keep him distracted of thoughts about his wife leaving him. He reports that January and February are difficultly months for him due to anniversary of getting ,wife's birthday, and wife leaving him. He reports still feeling sad related to the loss but denies any self-harm thoughts. He reports that he has not had any self-harm thoughts for awhile.He reports that he is sleeping well and has been engaging in exercise. He reports support from family friend and cousin. He reports that he is still working with  regarding his green card and other benefits He would like to visit parents in [place] whom he has not seen in a long time. He questioned how long he has to continue his antidepressant since he is feeling better and functioning better. He will discuss with psychiatrist tomorrow whether he can start to reduce medication dosage. Due to his work schedule and feeling better he would like to keep sessions once a month.  [FreeTextEntry1] : Patient will focus on positive coping methods, continue work activity, continue medication regimen, followup with psychiatrist, and followup with therapy on 2/27.

## 2023-03-01 NOTE — REASON FOR VISIT
[Patient] : Patient [FreeTextEntry1] : Patient is a 27 year old male being seen for a followup therapy session. COVID screening is negative.

## 2023-04-07 ENCOUNTER — APPOINTMENT (OUTPATIENT)
Dept: PSYCHIATRY | Facility: CLINIC | Age: 28
End: 2023-04-07
Payer: COMMERCIAL

## 2023-04-07 ENCOUNTER — OUTPATIENT (OUTPATIENT)
Dept: OUTPATIENT SERVICES | Facility: HOSPITAL | Age: 28
LOS: 1 days | End: 2023-04-07
Payer: COMMERCIAL

## 2023-04-07 DIAGNOSIS — F41.1 GENERALIZED ANXIETY DISORDER: ICD-10-CM

## 2023-04-07 DIAGNOSIS — F33.1 MAJOR DEPRESSIVE DISORDER, RECURRENT, MODERATE: ICD-10-CM

## 2023-04-07 DIAGNOSIS — F32.2 MAJOR DEPRESSIVE DISORDER, SINGLE EPISODE, SEVERE WITHOUT PSYCHOTIC FEATURES: ICD-10-CM

## 2023-04-07 PROCEDURE — 99214 OFFICE O/P EST MOD 30 MIN: CPT | Mod: 95

## 2023-04-07 PROCEDURE — 99214 OFFICE O/P EST MOD 30 MIN: CPT

## 2023-04-07 NOTE — REVIEW OF SYSTEMS
[Negative] : Allergic/Immunologic [FreeTextEntry2] : fatigue [FreeTextEntry7] : occasional diarrhea [de-identified] : occasional headaches
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
## 2023-04-07 NOTE — PHYSICAL EXAM
[None] : none [Anxious] : no anxious [Appropriate] : appropriate [Oriented] : oriented [Normal] : good [Adeq for basic needs] : adequate for basic needs [FreeTextEntry1] : neatly groomed, well-dressed, anxious [FreeTextEntry7] : transient passive  suicidal ideation [FreeTextEntry8] : less depressed [de-identified] : impaired

## 2023-04-07 NOTE — HISTORY OF PRESENT ILLNESS
[de-identified] : Pt came in person for his visit. 30mins\par Pt says that he is doing better and meds work. Tolerates them well. Feels better. However, sometimes, he feels very lonely and anxious.Work is slow and he doesn't make a lot of money, though he works hard. Spoke about his parents back in [place] and his childhood (he has been here for 10 y. His dad used to work in [place] and send money for his family, etc). Pt gained 10 lbs, but says that this doesn't bother him since he had lost more than 35 lbs due to severe depression. Pt already decreased zyprexa to 2.5mg and feels good. We will continue this dose for several more months and then will try to stop it.Pt sleeps well and doesn't have any perceptual disturbances.\par  Pt didn't have any psych history before his problems with wife (cheating on him, separation, etc)started.  Spoke again about his ex. \par  Denies SI/HI/AH/PI. Denies drugs and ETOH. C/w psychotherapy\par I received pt's labs (not scanned yet). Lipids are elevated. Healthy diet and life style were d/w pt.\par His citizenship case is coming along well.\par \par Pt is a 26 yo  male originally from [place] ([facility]) referred from [facility] where he presented due to poor sleep, severe anxiety, intrusive thoughts. dysphoria and transient suicidal thoughts which began in January after he discovered his wife of 2 years was cheating on him and threw him out of their home in [place].  He is currently staying with friends in [place], works as a   (drives to [place]). States that he feels very ashamed about his marital situation and has not told his family in [place] about it. He reports some episodes of anxiety with SOB, palpitations, headaches.He reports that he frequently thinks about the day his wife and new boyfriend ejected him from his home.\par Pt has no h/o IPP, no SA, no THERESE. Pt is applying for green card or citizenship. He has an . Pt has a job permit at this time. [No] : no [Yes] : yes [None Known] : none known [Mood episode] : mood episode [Recent humiliation/shame] : recent humiliation/shame [Responsibility to family or others] : responsibility to family or others [Supportive social network or family] : supportive social network or family [High spirituality] : high spirituality [Other___] : [unfilled]

## 2023-04-08 DIAGNOSIS — F32.2 MAJOR DEPRESSIVE DISORDER, SINGLE EPISODE, SEVERE WITHOUT PSYCHOTIC FEATURES: ICD-10-CM

## 2023-04-08 DIAGNOSIS — F41.1 GENERALIZED ANXIETY DISORDER: ICD-10-CM

## 2023-06-14 ENCOUNTER — OUTPATIENT (OUTPATIENT)
Dept: OUTPATIENT SERVICES | Facility: HOSPITAL | Age: 28
LOS: 1 days | End: 2023-06-14
Payer: COMMERCIAL

## 2023-06-14 ENCOUNTER — APPOINTMENT (OUTPATIENT)
Dept: PSYCHIATRY | Facility: CLINIC | Age: 28
End: 2023-06-14

## 2023-06-14 ENCOUNTER — APPOINTMENT (OUTPATIENT)
Dept: PSYCHIATRY | Facility: CLINIC | Age: 28
End: 2023-06-14
Payer: COMMERCIAL

## 2023-06-14 DIAGNOSIS — F33.1 MAJOR DEPRESSIVE DISORDER, RECURRENT, MODERATE: ICD-10-CM

## 2023-06-14 DIAGNOSIS — F43.22 ADJUSTMENT DISORDER WITH ANXIETY: ICD-10-CM

## 2023-06-14 DIAGNOSIS — F32.2 MAJOR DEPRESSIVE DISORDER, SINGLE EPISODE, SEVERE W/OUT PSYCHOTIC FEATURES: ICD-10-CM

## 2023-06-14 PROCEDURE — 99214 OFFICE O/P EST MOD 30 MIN: CPT

## 2023-06-14 NOTE — REVIEW OF SYSTEMS
[Negative] : Allergic/Immunologic [FreeTextEntry2] : fatigue [FreeTextEntry7] : occasional diarrhea [de-identified] : occasional headaches

## 2023-06-14 NOTE — DISCUSSION/SUMMARY
[FreeTextEntry1] : Pt came in person for his visit. \par Pt just came from [place], where he stayed for 25 days visiting his parents and family. Pt hadn't seen them for 1 y.\par He is doing much better and says that  meds work. Tolerates them well. Spoke about his parents back in [place] and his childhood (he has been here for 11 y. His dad used to work in [place] and send money for his family, etc). Pt Pt already decreased zyprexa to 2.5mg and feels good. We will continue this dose for several more months and then will try to stop it.Pt sleeps well and doesn't have any perceptual disturbances.\par  Pt didn't have any psych history before his problems with wife (cheating on him, separation, etc)started.  Spoke again about his ex. \par  Denies SI/HI/AH/PI. Denies drugs and ETOH. C/w psychotherapy\par I received pt's labs . Lipids are elevated. Healthy diet and life style were d/w pt.\par His citizenship case is coming along well.\par \aris Pt is a 27 yo  male originally from [facility] ([facility]) referred from [facility] ER where he presented due to poor sleep, severe anxiety, intrusive thoughts. dysphoria and transient suicidal thoughts which began in January after he discovered his wife of 2 years was cheating on him and threw him out of their home in [place].  He is currently staying with friends in [place], works as a   (drives to [place]). States that he feels very ashamed about his marital situation and has not told his family in [place] about it. He reports some episodes of anxiety with SOB, palpitations, headaches.He reports that he frequently thinks about the day his wife and new boyfriend ejected him from his home.\par Pt has no h/o IPP, no SA, no THERESE. Pt is applying for green card or citizenship. He has an . Pt has a job permit at this time.\par \par Pt is at elevated chronic suicide risk given his recent marital issues, current mood symptoms and anxiety. He is not an acute risk at present and contracts for safety.  Risk is mitigated by responsibility to family in [place], his Worship beliefs and supportive friends. Friends are providing supervision and monitoring.\par He remains under stress regarding his immigration status. \par \par 1. Next appt in 2m in person\par 2. We received his labs  and discussed the results with him\par 3. Try to d/c zyprexa (was discussed with him). Send the same amount though, and he will try to stop (I don't know why it was prescribed)\par \par   [Date of Last Physical Exam: _____] : Date of Last Physical Exam: [unfilled] [Date of Last Annual Labs: _____] : Date of Last Annual Labs: [unfilled] [Tobacco Screening Completed?] : Tobacco Screening Completed: Yes [Date of Last AIMS: _____] : Date of Last AIMS: [unfilled] [Date of Last HbgA1c: _____] : Date of Last HbgA1c: [unfilled] [Date of Last Lipid Profile: _____] : Date of Last Lipid Profile: [unfilled] [Potential impact of patient’s physical health conditions on psychiatric care?] : Potential impact of patient’s physical health conditions on psychiatric care: No [Does patient require any additional health services or referrals?] : Does patient require any additional health services or referrals: No

## 2023-06-14 NOTE — HISTORY OF PRESENT ILLNESS
[de-identified] : Pt came in person for his visit. \par Pt just came from [place], where he stayed for 25 days visiting his parents and family. Pt hadn't seen them for 1 y.\par He is doing much better and says that  meds work. Tolerates them well. Spoke about his parents back in [place] and his childhood (he has been here for 11 y. His dad used to work in [place] and send money for his family, etc). Pt Pt already decreased zyprexa to 2.5mg and feels good. We will continue this dose for several more months and then will try to stop it.Pt sleeps well and doesn't have any perceptual disturbances.\par  Pt didn't have any psych history before his problems with wife (cheating on him, separation, etc)started.  Spoke again about his ex. \par  Denies SI/HI/AH/PI. Denies drugs and ETOH. C/w psychotherapy\par I received pt's labs . Lipids are elevated. Healthy diet and life style were d/w pt.\par His citizenship case is coming along well.\par  [No] : no [Yes] : yes [None Known] : none known [Mood episode] : mood episode [Recent humiliation/shame] : recent humiliation/shame [Responsibility to family or others] : responsibility to family or others [Supportive social network or family] : supportive social network or family [High spirituality] : high spirituality [Other___] : [unfilled]

## 2023-06-14 NOTE — PHYSICAL EXAM
[0] : 0 [No] : No [None] : none [Anxious] : no anxious [Appropriate] : appropriate [Oriented] : oriented [Normal] : good [Adeq for basic needs] : adequate for basic needs [FreeTextEntry1] : neatly groomed, well-dressed, polite [FreeTextEntry7] : transient passive  suicidal ideation [FreeTextEntry8] : much better [de-identified] : impaired

## 2023-06-15 DIAGNOSIS — F33.1 MAJOR DEPRESSIVE DISORDER, RECURRENT, MODERATE: ICD-10-CM

## 2023-07-04 NOTE — ED ADULT NURSE NOTE - BRAND OF COVID-19 VACCINATION
Pfizer dose 1 and 2 Well appearing, awake, alert, oriented to person, place, time/situation and in no apparent distress. normal...

## 2023-08-14 ENCOUNTER — OUTPATIENT (OUTPATIENT)
Dept: OUTPATIENT SERVICES | Facility: HOSPITAL | Age: 28
LOS: 1 days | End: 2023-08-14
Payer: COMMERCIAL

## 2023-08-14 ENCOUNTER — APPOINTMENT (OUTPATIENT)
Dept: PSYCHIATRY | Facility: CLINIC | Age: 28
End: 2023-08-14
Payer: COMMERCIAL

## 2023-08-14 DIAGNOSIS — F33.1 MAJOR DEPRESSIVE DISORDER, RECURRENT, MODERATE: ICD-10-CM

## 2023-08-14 PROCEDURE — 99214 OFFICE O/P EST MOD 30 MIN: CPT

## 2023-08-14 NOTE — HISTORY OF PRESENT ILLNESS
[de-identified] : Pt came in person 30 mons early for his visit.  Pt says that 1 week ago, he saw his ex and felt more depressed, because he still loves her. He feels better last couple of days. He is busy at work and  wants to earn more money to start dating again. Spoke about his support network (friends from [place]). 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  Pt didn't stop zyprexa (he didn't remember that he was supposed to). He already decreased zyprexa to 2.5mg and feels good. I told pt to try to stop zyprexa, when he will have a week off. Pt sleeps well and doesn't have any perceptual disturbances. He seems to gain weight and also has hypercholesterolemia.  Pt didn't have any psych history before his problems with wife (cheating on him, separation, etc)started.  Spoke again about his ex.   Denies SI/HI/AH/PI. Denies drugs and ETOH. C/w psychotherapy I received pt's labs . Lipids are elevated. Healthy diet and life style were d/w pt. Risks of his meds were discussed too. His citizenship case is coming along well. [No] : no [Yes] : yes [None Known] : none known [Mood episode] : mood episode [Recent humiliation/shame] : recent humiliation/shame [Responsibility to family or others] : responsibility to family or others [Supportive social network or family] : supportive social network or family [High spirituality] : high spirituality [Other___] : [unfilled]

## 2023-08-14 NOTE — PHYSICAL EXAM
[0] : 0 [No] : No [None] : none [Anxious] : no anxious [Appropriate] : appropriate [Oriented] : oriented [Normal] : good [Adeq for basic needs] : adequate for basic needs [FreeTextEntry1] : neatly groomed, well-dressed, polite [FreeTextEntry7] : transient passive  suicidal ideation [FreeTextEntry8] :  better [de-identified] : impaired

## 2023-08-14 NOTE — REVIEW OF SYSTEMS
[Negative] : Allergic/Immunologic [FreeTextEntry2] : fatigue [FreeTextEntry7] : occasional diarrhea [de-identified] : occasional headaches

## 2023-08-14 NOTE — DISCUSSION/SUMMARY
[FreeTextEntry1] : Pt came in person 30 mons early for his visit.  Pt says that 1 week ago, he saw his ex and felt more depressed, because he still loves her. He feels better last couple of days. He is busy at work and  wants to earn more money to start dating again. Spoke about his support network (friends from [place]). 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  Pt didn't stop zyprexa (he didn't remember that he was supposed to). He already decreased zyprexa to 2.5mg and feels good. I told pt to try to stop zyprexa, when he will have a week off. Pt sleeps well and doesn't have any perceptual disturbances. He seems to gain weight and also has hypercholesterolemia.  Pt didn't have any psych history before his problems with wife (cheating on him, separation, etc)started.  Spoke again about his ex.   Denies SI/HI/AH/PI. Denies drugs and ETOH. C/w psychotherapy I received pt's labs . Lipids are elevated. Healthy diet and life style were d/w pt. Risks of his meds were discussed too. His citizenship case is coming along well.  Pt is a 29 yo  male originally from [facility] ([facility]) referred from [facility] ER where he presented due to poor sleep, severe anxiety, intrusive thoughts. dysphoria and transient suicidal thoughts which began in January after he discovered his wife of 2 years was cheating on him and threw him out of their home in [place].  He is currently staying with friends in [place], works as a   (drives to [place]). States that he feels very ashamed about his marital situation and has not told his family in [place] about it. He reports some episodes of anxiety with SOB, palpitations, headaches.He reports that he frequently thinks about the day his wife and new boyfriend ejected him from his home. Pt has no h/o IPP, no SA, no THERESE. Pt is applying for green card or citizenship. He has an . Pt has a job permit at this time.  Pt is at elevated chronic suicide risk given his recent marital issues, current mood symptoms and anxiety. He is not an acute risk at present and contracts for safety.  Risk is mitigated by responsibility to family in [place], his Yarsanism beliefs and supportive friends. Friends are providing supervision and monitoring. He remains under stress regarding his immigration status.   1. Next appt in 2m in person 2. We received his labs  and discussed the results with him (increased cholesterol) 3.Did he d/c zyprexa (was discussed with him). I sent the same amount though, and he will try to stop (I don't know why it was prescribed)

## 2023-08-15 DIAGNOSIS — F33.1 MAJOR DEPRESSIVE DISORDER, RECURRENT, MODERATE: ICD-10-CM

## 2023-10-13 ENCOUNTER — APPOINTMENT (OUTPATIENT)
Dept: PSYCHIATRY | Facility: CLINIC | Age: 28
End: 2023-10-13
Payer: COMMERCIAL

## 2023-10-13 ENCOUNTER — OUTPATIENT (OUTPATIENT)
Dept: OUTPATIENT SERVICES | Facility: HOSPITAL | Age: 28
LOS: 1 days | End: 2023-10-13
Payer: COMMERCIAL

## 2023-10-13 DIAGNOSIS — F33.1 MAJOR DEPRESSIVE DISORDER, RECURRENT, MODERATE: ICD-10-CM

## 2023-10-13 PROCEDURE — 99214 OFFICE O/P EST MOD 30 MIN: CPT

## 2023-10-13 RX ORDER — OLANZAPINE 2.5 MG/1
2.5 TABLET, FILM COATED ORAL
Qty: 30 | Refills: 1 | Status: DISCONTINUED | COMMUNITY
Start: 2022-04-08 | End: 2023-10-13

## 2023-10-14 DIAGNOSIS — F33.1 MAJOR DEPRESSIVE DISORDER, RECURRENT, MODERATE: ICD-10-CM

## 2023-12-14 ENCOUNTER — OUTPATIENT (OUTPATIENT)
Dept: OUTPATIENT SERVICES | Facility: HOSPITAL | Age: 28
LOS: 1 days | End: 2023-12-14
Payer: COMMERCIAL

## 2023-12-14 ENCOUNTER — APPOINTMENT (OUTPATIENT)
Dept: PSYCHIATRY | Facility: CLINIC | Age: 28
End: 2023-12-14
Payer: COMMERCIAL

## 2023-12-14 DIAGNOSIS — F33.1 MAJOR DEPRESSIVE DISORDER, RECURRENT, MODERATE: ICD-10-CM

## 2023-12-14 PROCEDURE — 99214 OFFICE O/P EST MOD 30 MIN: CPT

## 2023-12-14 PROCEDURE — 99213 OFFICE O/P EST LOW 20 MIN: CPT

## 2023-12-14 PROCEDURE — 99203 OFFICE O/P NEW LOW 30 MIN: CPT

## 2023-12-14 NOTE — DISCUSSION/SUMMARY
[FreeTextEntry1] : Pt came in person for his visit.  Pt says that he is doing much better. Objectively, pt shows significant improvement. He is less anxious, more confident, takes much better care of self. 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 Pt denies feeling depressed at this time and also doesn't become very anxious or depressed when he sees or thinks about his ex. Pt stopped zyprexa per my instructions and he doesn't feel worse after that. We are continuing lexapro only. Pt started to lose weight after that. He also started to work out and feels better. Healthy diet and life style were discussed with him. Pt doesn't have any perceptual disturbances. He seems to gain weight and also has hypercholesterolemia.  Pt didn't have any psych history before his problems with wife (cheating on him, separation, etc)started.  Spoke again about his ex.   Pt doesn't want therapy at this point. However, he spoke about his feelings, previous experiences, parents, future plans, etc with me. Supportive therapy and education were provided.  Denies SI/HI/AH/PI. Denies drugs and ETOH.  Risks of pt's meds were discussed too. His citizenship case is coming along well.  Pt is a 27 yo  male originally from [place] ([facility]) referred from [facility] ER where he presented due to poor sleep, severe anxiety, intrusive thoughts. dysphoria and transient suicidal thoughts which began in January after he discovered his wife of 2 years was cheating on him and threw him out of their home in [place].  He is currently staying with friends in [place], works as a   (drives to [place]). States that he feels very ashamed about his marital situation and has not told his family in [place] about it. He reports some episodes of anxiety with SOB, palpitations, headaches.He reports that he frequently thinks about the day his wife and new boyfriend ejected him from his home. Pt has no h/o IPP, no SA, no THERESE. Pt is applying for green card or citizenship. He has an . Pt has a job permit at this time.  Pt is at elevated chronic suicide risk given his recent marital issues, current mood symptoms and anxiety. He is not an acute risk at present and contracts for safety.  Risk is mitigated by responsibility to family in [place], his Islam beliefs and supportive friends. Friends are providing supervision and monitoring. He remains under stress regarding his immigration status.   1. Next appt in 2m in person 2. We received his labs  and discussed the results with him (increased cholesterol) 3. continue lexapro 20mg (we stopped zyprexa) 4. Does he continue to lose weight after we stopped lexapro and he started to  work out

## 2023-12-14 NOTE — PHYSICAL EXAM
[0] : 0 [No] : No [None] : none [Appropriate] : appropriate [Oriented] : oriented [Normal] : good [Adeq for basic needs] : adequate for basic needs [FreeTextEntry1] : neatly groomed, well-dressed, polite [FreeTextEntry7] : transient passive  suicidal ideation [de-identified] : impaired [FreeTextEntry8] : much better

## 2023-12-14 NOTE — HISTORY OF PRESENT ILLNESS
[de-identified] : Pt came in person for his visit.  Pt says that he is doing much better. Objectively, pt shows significant improvement. He is less anxious, more confident, takes much better care of self. 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 Pt denies feeling depressed at this time and also doesn't become very anxious or depressed when he sees or thinks about his ex. Pt stopped zyprexa per my instructions and he doesn't feel worse after that. We are continuing lexapro only. Pt started to lose weight after that. He also started to work out and feels better. Healthy diet and life style were discussed with him. Pt doesn't have any perceptual disturbances. He seems to gain weight and also has hypercholesterolemia.  Pt didn't have any psych history before his problems with wife (cheating on him, separation, etc)started.  Spoke again about his ex.   Pt doesn't want therapy at this point. However, he spoke about his feelings, previous experiences, parents, future plans, etc with me. Supportive therapy and education were provided.  Denies SI/HI/AH/PI. Denies drugs and ETOH.  Risks of pt's meds were discussed too. His citizenship case is coming along well. [No] : no [Yes] : yes [None Known] : none known [Mood episode] : mood episode [Recent humiliation/shame] : recent humiliation/shame [Responsibility to family or others] : responsibility to family or others [Supportive social network or family] : supportive social network or family [High spirituality] : high spirituality [Other___] : [unfilled]

## 2023-12-14 NOTE — REVIEW OF SYSTEMS
Problem: POSTPARTUM  Goal: Optimize infant feeding at the breast  Description: INTERVENTIONS:  - Initiate breast feeding within first hour after birth. - Monitor effectiveness of current breast feeding efforts. - Assess support systems available to mother/family.  - Identify cultural beliefs/practices regarding lactation, letdown techniques, maternal food preferences. - Assess mother's knowledge and previous experience with breast feeding.  - Provide information as needed about early infant feeding cues (e.g., rooting, lip smacking, sucking fingers/hand) versus late cue of crying.  - Discuss/demonstrate breast feeding aids (e.g., infant sling, nursing footstool/pillows, and breast pumps). - Encourage mother/other family members to express feelings/concerns, and actively listen. - Educate father/SO about benefits of breast feeding and how to manage common lactation challenges. - Recommend avoidance of specific medications or substances incompatible with breast feeding.  - Assess and monitor for signs of nipple pain/trauma. - Instruct and provide assistance with proper latch. - Review techniques for milk expression (breast pumping) and storage of breast milk. Provide pumping equipment/supplies, instructions and assistance, as needed. - Encourage rooming-in and breast feeding on demand.  - Encourage skin-to-skin contact. - Provide LC support as needed. - Assess for and manage engorgement. - Provide breast feeding education handouts and information on community breast feeding support. Outcome: Progressing  Goal: Establishment of adequate milk supply with medication/procedure interruptions  Description: INTERVENTIONS:  - Review techniques for milk expression (breast pumping). - Provide pumping equipment/supplies, instructions, and assistance until it is safe to breastfeed infant.   Outcome: Progressing  Goal: Experiences normal breast weaning course  Description: INTERVENTIONS:  - Assess for and manage engorgement. - Instruct on breast care. - Provide comfort measures. Outcome: Progressing  Goal: Appropriate maternal -  bonding  Description: INTERVENTIONS:  - Assess caregiver- interactions. - Assess caregiver's emotional status and coping mechanisms. - Encourage caregiver to participate in  daily care. - Assess support systems available to mother/family.  - Provide /case management support as needed.   Outcome: Progressing [Negative] : Allergic/Immunologic [FreeTextEntry2] : fatigue [FreeTextEntry7] : occasional diarrhea [de-identified] : occasional headaches

## 2023-12-15 DIAGNOSIS — F33.1 MAJOR DEPRESSIVE DISORDER, RECURRENT, MODERATE: ICD-10-CM

## 2024-03-15 ENCOUNTER — APPOINTMENT (OUTPATIENT)
Dept: PSYCHIATRY | Facility: CLINIC | Age: 29
End: 2024-03-15

## 2024-03-26 ENCOUNTER — OUTPATIENT (OUTPATIENT)
Dept: OUTPATIENT SERVICES | Facility: HOSPITAL | Age: 29
LOS: 1 days | End: 2024-03-26
Payer: COMMERCIAL

## 2024-03-26 ENCOUNTER — APPOINTMENT (OUTPATIENT)
Dept: PSYCHIATRY | Facility: CLINIC | Age: 29
End: 2024-03-26
Payer: COMMERCIAL

## 2024-03-26 DIAGNOSIS — F41.9 MAJOR DEPRESSIVE DISORDER, RECURRENT SEVERE W/OUT PSYCHOTIC FEATURES: ICD-10-CM

## 2024-03-26 DIAGNOSIS — F60.9 PERSONALITY DISORDER, UNSPECIFIED: ICD-10-CM

## 2024-03-26 DIAGNOSIS — F33.2 MAJOR DEPRESSIVE DISORDER, RECURRENT SEVERE WITHOUT PSYCHOTIC FEATURES: ICD-10-CM

## 2024-03-26 DIAGNOSIS — F41.0 GENERALIZED ANXIETY DISORDER: ICD-10-CM

## 2024-03-26 DIAGNOSIS — F51.05 INSOMNIA DUE TO OTHER MENTAL DISORDER: ICD-10-CM

## 2024-03-26 DIAGNOSIS — F33.2 MAJOR DEPRESSIVE DISORDER, RECURRENT SEVERE W/OUT PSYCHOTIC FEATURES: ICD-10-CM

## 2024-03-26 DIAGNOSIS — F41.1 GENERALIZED ANXIETY DISORDER: ICD-10-CM

## 2024-03-26 PROCEDURE — 99214 OFFICE O/P EST MOD 30 MIN: CPT

## 2024-03-26 RX ORDER — ESCITALOPRAM OXALATE 20 MG/1
20 TABLET ORAL
Qty: 30 | Refills: 2 | Status: ACTIVE | COMMUNITY
Start: 2022-03-25 | End: 1900-01-01

## 2024-03-26 NOTE — REVIEW OF SYSTEMS
[Negative] : Allergic/Immunologic [FreeTextEntry2] : fatigue [FreeTextEntry7] : occasional diarrhea [de-identified] : occasional headaches

## 2024-03-26 NOTE — PHYSICAL EXAM
[No] : No [0] : 0 [None] : none [Appropriate] : appropriate [Oriented] : oriented [Normal] : good [Adeq for basic needs] : adequate for basic needs [FreeTextEntry1] : neatly groomed, well-dressed, polite [FreeTextEntry7] : transient passive  suicidal ideation [FreeTextEntry8] : much better [de-identified] : impaired

## 2024-03-26 NOTE — DISCUSSION/SUMMARY
[FreeTextEntry1] : Pt came in person for his visit.  He wants to decrease his lexapro because he is doing better. Education was provided and I was able to convince him to  continue the same dose. We already d/c his zyprexa. Pt says that he is doing much better. Objectively, pt shows significant improvement. He is less anxious, more confident, takes much better care of self. Pt holds his job. He is in [employer] service and likes it . He says that it's much busier now and he makes more money. Pt denies feeling depressed at this time and also doesn't become very anxious or depressed when he sees or thinks about his ex. Pt stopped zyprexa per my instructions and he doesn't feel worse after that. Pt started to lose weight after that. He also started to work out and feels better. Healthy diet and life style were discussed with him. Pt doesn't have any perceptual disturbances. He seems to gain weight and also has hypercholesterolemia.  Pt didn't have any psych history before his problems with wife (cheating on him, separation, etc)started.  Spoke again about his ex.   Pt doesn't want therapy at this point. However, he spoke about his feelings, previous experiences, parents, future plans, etc with me. Supportive therapy and education were provided.  Denies SI/HI/AH/PI. Denies drugs and ETOH.  Risks of pt's meds were discussed too. His citizenship case is coming along well.  Pt is a 27 yo  male originally from [place] ([facility]) referred from [facility] where he presented due to poor sleep, severe anxiety, intrusive thoughts. dysphoria and transient suicidal thoughts which began in January after he discovered his wife of 2 years was cheating on him and threw him out of their home in [place].  He is currently staying with friends in [place], works as a   (drives to [place]). States that he feels very ashamed about his marital situation and has not told his family in [place] about it. He reports some episodes of anxiety with SOB, palpitations, headaches.He reports that he frequently thinks about the day his wife and new boyfriend ejected him from his home. Pt has no h/o IPP, no SA, no THERESE. Pt is applying for green card or citizenship. He has an . Pt has a job permit at this time.  Pt is at elevated chronic suicide risk given his recent marital issues, current mood symptoms and anxiety. He is not an acute risk at present and contracts for safety.  Risk is mitigated by responsibility to family in [place], his Cheondoism beliefs and supportive friends. Friends are providing supervision and monitoring. He remains under stress regarding his immigration status.   1. Next appt in 3m in person 2. Pt had labs done 2 weeks ago. I called his PCP, dr. Wilfred Farrell [phone] and requested results be faxed. 3. continue lexapro 20mg (we stopped zyprexa) 4. Does he continue to lose weight after we stopped lexapro and he started to  work out

## 2024-03-26 NOTE — HISTORY OF PRESENT ILLNESS
[No] : no [Yes] : yes [None Known] : none known [Recent humiliation/shame] : recent humiliation/shame [Mood episode] : mood episode [Responsibility to family or others] : responsibility to family or others [Supportive social network or family] : supportive social network or family [High spirituality] : high spirituality [Other___] : [unfilled] [FreeTextEntry1] : Pt came in person for his visit.  He wants to decrease his lexapro because he is doing better. Education was provided and I was able to convince him to  continue the same dose. We already d/c his zyprexa. Pt says that he is doing much better. Objectively, pt shows significant improvement. He is less anxious, more confident, takes much better care of self. Pt holds his job. He is in [employer] service and likes it . He says that it's much busier now and he makes more money. Pt denies feeling depressed at this time and also doesn't become very anxious or depressed when he sees or thinks about his ex. Pt stopped zyprexa per my instructions and he doesn't feel worse after that. Pt started to lose weight after that. He also started to work out and feels better. Healthy diet and life style were discussed with him. Pt doesn't have any perceptual disturbances. He seems to gain weight and also has hypercholesterolemia.  Pt didn't have any psych history before his problems with wife (cheating on him, separation, etc)started.  Spoke again about his ex.   Pt doesn't want therapy at this point. However, he spoke about his feelings, previous experiences, parents, future plans, etc with me. Supportive therapy and education were provided.  Denies SI/HI/AH/PI. Denies drugs and ETOH.  Risks of pt's meds were discussed too. His citizenship case is coming along well.

## 2024-03-27 DIAGNOSIS — F33.2 MAJOR DEPRESSIVE DISORDER, RECURRENT SEVERE WITHOUT PSYCHOTIC FEATURES: ICD-10-CM

## 2024-06-25 ENCOUNTER — APPOINTMENT (OUTPATIENT)
Dept: PSYCHIATRY | Facility: CLINIC | Age: 29
End: 2024-06-25

## 2024-07-25 ENCOUNTER — APPOINTMENT (OUTPATIENT)
Dept: PSYCHIATRY | Facility: CLINIC | Age: 29
End: 2024-07-25
Payer: COMMERCIAL

## 2024-07-25 DIAGNOSIS — F41.0 GENERALIZED ANXIETY DISORDER: ICD-10-CM

## 2024-07-25 DIAGNOSIS — F60.9 PERSONALITY DISORDER, UNSPECIFIED: ICD-10-CM

## 2024-07-25 DIAGNOSIS — F51.05 INSOMNIA DUE TO OTHER MENTAL DISORDER: ICD-10-CM

## 2024-07-25 DIAGNOSIS — F41.1 GENERALIZED ANXIETY DISORDER: ICD-10-CM

## 2024-07-25 DIAGNOSIS — F41.9 MAJOR DEPRESSIVE DISORDER, RECURRENT SEVERE W/OUT PSYCHOTIC FEATURES: ICD-10-CM

## 2024-07-25 DIAGNOSIS — F33.2 MAJOR DEPRESSIVE DISORDER, RECURRENT SEVERE W/OUT PSYCHOTIC FEATURES: ICD-10-CM

## 2024-07-25 PROCEDURE — 99214 OFFICE O/P EST MOD 30 MIN: CPT

## 2024-07-25 NOTE — PHYSICAL EXAM
[0] : 0 [No] : No [None] : none [Appropriate] : appropriate [Oriented] : oriented [Normal] : good [Adeq for basic needs] : adequate for basic needs [FreeTextEntry1] : neatly groomed, well-dressed, polite [FreeTextEntry7] : transient passive  suicidal ideation [FreeTextEntry8] : much better [de-identified] : impaired

## 2024-07-25 NOTE — HISTORY OF PRESENT ILLNESS
[No] : no [Yes] : yes [None Known] : none known [Mood episode] : mood episode [Recent humiliation/shame] : recent humiliation/shame [Responsibility to family or others] : responsibility to family or others [Supportive social network or family] : supportive social network or family [High spirituality] : high spirituality [Other___] : [unfilled] [FreeTextEntry1] : Pt came in person for his visit.  Pt is not losing weight but says that he works all time and doesn't watch his diet and doesn't work out. I got his labs and we discussed results. Everything is WNL. Pt missed his last appt and ran out of meds 1 week ago. Compliance was encouraged. Pt says that he is doing much better. Objectively, pt shows significant improvement. He is less anxious, more confident, takes much better care of self. Pt holds his job. He is in [employer] and likes it.  He says that it's much busier now and he makes more money. Pt denies feeling depressed at this time and also doesn't become very anxious or depressed when he sees or thinks about his ex. Pt stopped zyprexa per my instructions and he doesn't feel worse after that. Pt started to lose weight after that. He also started to work out and feels better. Healthy diet and life style were discussed with him. Pt doesn't have any perceptual disturbances. He seems to gain weight and also has hypercholesterolemia.  Pt didn't have any psych history before his problems with wife (cheating on him, separation, etc)started.  Spoke again about his ex.   Pt doesn't want therapy at this point. However, he spoke about his feelings, previous experiences, parents, future plans, etc with me. Supportive therapy and education were provided.  Denies SI/HI/AH/PI. Denies drugs and ETOH.  Risks of pt's meds were discussed too. His citizenship case is coming along well.  Pt is a 28 yo  male originally from [place] ([facility]) referred from [facility] ER where he presented due to poor sleep, severe anxiety, intrusive thoughts. dysphoria and transient suicidal thoughts which began in January after he discovered his wife of 2 years was cheating on him and threw him out of their home in [place].  He is currently staying with friends in [place], works as a   (drives to [place]). States that he feels very ashamed about his marital situation and has not told his family in [place] about it. He reports some episodes of anxiety with SOB, palpitations, headaches.He reports that he frequently thinks about the day his wife and new boyfriend ejected him from his home. Pt has no h/o IPP, no SA, no THERESE. Pt is applying for green card or citizenship. He has an . Pt has a job permit at this time.

## 2024-07-25 NOTE — DISCUSSION/SUMMARY
[FreeTextEntry1] : Pt came in person for his visit.  Pt is not losing weight but says that he works all time and doesn't watch his diet and doesn't work out. I got his labs and we discussed results. Everything is WNL. Pt missed his last appt and ran out of meds 1 week ago. Compliance was encouraged. Pt says that he is doing much better. Objectively, pt shows significant improvement. He is less anxious, more confident, takes much better care of self. Pt holds his job. He is in [employer] and likes it.  He says that it's much busier now and he makes more money. Pt denies feeling depressed at this time and also doesn't become very anxious or depressed when he sees or thinks about his ex. Pt stopped zyprexa per my instructions and he doesn't feel worse after that. Pt started to lose weight after that. He also started to work out and feels better. Healthy diet and life style were discussed with him. Pt doesn't have any perceptual disturbances. He seems to gain weight and also has hypercholesterolemia.  Pt didn't have any psych history before his problems with wife (cheating on him, separation, etc)started.  Spoke again about his ex.   Pt doesn't want therapy at this point. However, he spoke about his feelings, previous experiences, parents, future plans, etc with me. Supportive therapy and education were provided.  Denies SI/HI/AH/PI. Denies drugs and ETOH.  Risks of pt's meds were discussed too. His citizenship case is coming along well.  Pt is a 28 yo  male originally from [place] ([facility]) referred from [facility] ER where he presented due to poor sleep, severe anxiety, intrusive thoughts. dysphoria and transient suicidal thoughts which began in January after he discovered his wife of 2 years was cheating on him and threw him out of their home in [place].  He is currently staying with friends in [place], works as a   (drives to [place]). States that he feels very ashamed about his marital situation and has not told his family in [place] about it. He reports some episodes of anxiety with SOB, palpitations, headaches.He reports that he frequently thinks about the day his wife and new boyfriend ejected him from his home. Pt has no h/o IPP, no SA, no THERESE. Pt is applying for green card or citizenship. He has an . Pt has a job permit at this time.  Pt is at elevated chronic suicide risk given his recent marital issues, current mood symptoms and anxiety. He is not an acute risk at present and contracts for safety.  Risk is mitigated by responsibility to family in [place], his Congregational beliefs and supportive friends. Friends are providing supervision and monitoring. He remains under stress regarding his immigration status.   1. Next appt in 3m in person 2. Labs are in the chart and were discussed (WNL) 3. continue lexapro 20mg (we stopped zyprexa)

## 2024-07-25 NOTE — REVIEW OF SYSTEMS
[Negative] : Allergic/Immunologic [FreeTextEntry2] : fatigue [FreeTextEntry7] : occasional diarrhea [de-identified] : occasional headaches

## 2024-10-24 ENCOUNTER — APPOINTMENT (OUTPATIENT)
Dept: PSYCHIATRY | Facility: CLINIC | Age: 29
End: 2024-10-24
Payer: COMMERCIAL

## 2024-10-24 DIAGNOSIS — F41.9 MAJOR DEPRESSIVE DISORDER, RECURRENT SEVERE W/OUT PSYCHOTIC FEATURES: ICD-10-CM

## 2024-10-24 DIAGNOSIS — F41.1 GENERALIZED ANXIETY DISORDER: ICD-10-CM

## 2024-10-24 DIAGNOSIS — F51.05 INSOMNIA DUE TO OTHER MENTAL DISORDER: ICD-10-CM

## 2024-10-24 DIAGNOSIS — F33.2 MAJOR DEPRESSIVE DISORDER, RECURRENT SEVERE W/OUT PSYCHOTIC FEATURES: ICD-10-CM

## 2024-10-24 DIAGNOSIS — F41.0 GENERALIZED ANXIETY DISORDER: ICD-10-CM

## 2024-10-24 DIAGNOSIS — F60.9 PERSONALITY DISORDER, UNSPECIFIED: ICD-10-CM

## 2024-10-24 PROCEDURE — 99214 OFFICE O/P EST MOD 30 MIN: CPT

## 2025-01-24 ENCOUNTER — APPOINTMENT (OUTPATIENT)
Dept: PSYCHIATRY | Facility: CLINIC | Age: 30
End: 2025-01-24
Payer: COMMERCIAL

## 2025-01-24 ENCOUNTER — OUTPATIENT (OUTPATIENT)
Dept: OUTPATIENT SERVICES | Facility: HOSPITAL | Age: 30
LOS: 1 days | End: 2025-01-24
Payer: COMMERCIAL

## 2025-01-24 DIAGNOSIS — F41.9 MAJOR DEPRESSIVE DISORDER, RECURRENT SEVERE W/OUT PSYCHOTIC FEATURES: ICD-10-CM

## 2025-01-24 DIAGNOSIS — F60.9 PERSONALITY DISORDER, UNSPECIFIED: ICD-10-CM

## 2025-01-24 DIAGNOSIS — F41.1 GENERALIZED ANXIETY DISORDER: ICD-10-CM

## 2025-01-24 DIAGNOSIS — F33.2 MAJOR DEPRESSIVE DISORDER, RECURRENT SEVERE W/OUT PSYCHOTIC FEATURES: ICD-10-CM

## 2025-01-24 DIAGNOSIS — F41.0 GENERALIZED ANXIETY DISORDER: ICD-10-CM

## 2025-01-24 DIAGNOSIS — F51.05 INSOMNIA DUE TO OTHER MENTAL DISORDER: ICD-10-CM

## 2025-01-24 PROCEDURE — 99214 OFFICE O/P EST MOD 30 MIN: CPT

## 2025-01-25 DIAGNOSIS — F41.1 GENERALIZED ANXIETY DISORDER: ICD-10-CM

## 2025-04-25 ENCOUNTER — APPOINTMENT (OUTPATIENT)
Dept: PSYCHIATRY | Facility: CLINIC | Age: 30
End: 2025-04-25
Payer: COMMERCIAL

## 2025-04-25 ENCOUNTER — OUTPATIENT (OUTPATIENT)
Dept: OUTPATIENT SERVICES | Facility: HOSPITAL | Age: 30
LOS: 1 days | End: 2025-04-25
Payer: COMMERCIAL

## 2025-04-25 DIAGNOSIS — F60.9 PERSONALITY DISORDER, UNSPECIFIED: ICD-10-CM

## 2025-04-25 DIAGNOSIS — F51.05 INSOMNIA DUE TO OTHER MENTAL DISORDER: ICD-10-CM

## 2025-04-25 DIAGNOSIS — F33.2 MAJOR DEPRESSIVE DISORDER, RECURRENT SEVERE WITHOUT PSYCHOTIC FEATURES: ICD-10-CM

## 2025-04-25 DIAGNOSIS — F41.9 MAJOR DEPRESSIVE DISORDER, RECURRENT SEVERE W/OUT PSYCHOTIC FEATURES: ICD-10-CM

## 2025-04-25 DIAGNOSIS — F41.1 GENERALIZED ANXIETY DISORDER: ICD-10-CM

## 2025-04-25 DIAGNOSIS — F41.0 GENERALIZED ANXIETY DISORDER: ICD-10-CM

## 2025-04-25 DIAGNOSIS — F33.2 MAJOR DEPRESSIVE DISORDER, RECURRENT SEVERE W/OUT PSYCHOTIC FEATURES: ICD-10-CM

## 2025-04-25 PROCEDURE — 90833 PSYTX W PT W E/M 30 MIN: CPT

## 2025-04-25 PROCEDURE — 99214 OFFICE O/P EST MOD 30 MIN: CPT

## 2025-04-26 DIAGNOSIS — F33.2 MAJOR DEPRESSIVE DISORDER, RECURRENT SEVERE WITHOUT PSYCHOTIC FEATURES: ICD-10-CM

## 2025-04-26 DIAGNOSIS — F51.05 INSOMNIA DUE TO OTHER MENTAL DISORDER: ICD-10-CM

## 2025-04-26 DIAGNOSIS — F60.9 PERSONALITY DISORDER, UNSPECIFIED: ICD-10-CM

## 2025-04-26 DIAGNOSIS — F41.1 GENERALIZED ANXIETY DISORDER: ICD-10-CM

## 2025-05-15 NOTE — DISCUSSION/SUMMARY
[FreeTextEntry1] : Pt came in person for his visit.\par Pt says that he is doing better and meds work. Tolerates them well. However, he started feeling a bit worse lately because hes ex wife left him on Febr.,4 and he was emotionally very traumatized. This date is coming up and he thinks about her betrayal much more around this time. I will not decrease zyprexa this time and will keep all his meds the same. 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  Spoke again about his ex. \par We also spoke about his symptoms. Pt denies AH at this time. However, he acknowledges to having AH sometimes (his ex's voice, talking to him). Denies SI/HI/AH/PI. Denies drugs and ETOH. C/w psychotherapy\par I received pt's labs (not scanned yet). Lipids are elevated\par \par \par \par Pt is a 28 yo  male originally from [place] ([facility]) referred from [facility] ER where he presented due to poor sleep, severe anxiety, intrusive thoughts. dysphoria and transient suicidal thoughts which began in January after he discovered his wife of 2 years was cheating on him and threw him out of their home in [place].  He is currently staying with friends in [place], works as a   (drives to [place]). States that he feels very ashamed about his marital situation and has not told his family in [place] about it. He reports some episodes of anxiety with SOB, palpitations, headaches.He reports that he frequently thinks about the day his wife and new boyfriend ejected him from his home.\par Pt has no h/o IPP, no SA, no THERESE. Pt is applying for green card or citizenship. He has an . Pt has a job permit at this time.\par \par Pt is at elevated chronic suicide risk given his recent marital issues, current mood symptoms and anxiety. He is not an acute risk at present and contracts for safety.  Risk is mitigated by responsibility to family in [place], his Church beliefs and supportive friends. Friends are providing supervision and monitoring.\par Patient reports partial improvement in symptoms since last viisit but mood is still dysphoric and he has episodes of increased anxiety.  He remains under stress regarding his immigration status. \par \par 1. Next appt in 1 m in person\par 2. We received his labs (were not scanned yet during this session)\par 3. Can we decrease zyprexa to 2.5mg? (she told me that he was taking a 1/2 tab, but he probably made a mistake)\par 4. Any news regarding his immigration status (has job permit at this time)\par   Patient/Caregiver provided printed discharge information.

## 2025-06-15 NOTE — ED ADULT TRIAGE NOTE - HEART RATE (BEATS/MIN)
89 FAMILY HISTORY:  Father  Still living? Yes, Estimated age: 55  Family history of renal stone, Age at diagnosis: Age Unknown    Mother  Still living? Yes, Estimated age: 49  FH: diabetes mellitus, Age at diagnosis: Age Unknown

## 2025-07-25 ENCOUNTER — APPOINTMENT (OUTPATIENT)
Dept: PSYCHIATRY | Facility: CLINIC | Age: 30
End: 2025-07-25
Payer: COMMERCIAL

## 2025-07-25 ENCOUNTER — OUTPATIENT (OUTPATIENT)
Dept: OUTPATIENT SERVICES | Facility: HOSPITAL | Age: 30
LOS: 1 days | End: 2025-07-25
Payer: COMMERCIAL

## 2025-07-25 DIAGNOSIS — F33.2 MAJOR DEPRESSIVE DISORDER, RECURRENT SEVERE WITHOUT PSYCHOTIC FEATURES: ICD-10-CM

## 2025-07-25 DIAGNOSIS — F33.2 MAJOR DEPRESSIVE DISORDER, RECURRENT SEVERE W/OUT PSYCHOTIC FEATURES: ICD-10-CM

## 2025-07-25 DIAGNOSIS — F60.9 PERSONALITY DISORDER, UNSPECIFIED: ICD-10-CM

## 2025-07-25 DIAGNOSIS — F41.1 GENERALIZED ANXIETY DISORDER: ICD-10-CM

## 2025-07-25 DIAGNOSIS — F51.05 INSOMNIA DUE TO OTHER MENTAL DISORDER: ICD-10-CM

## 2025-07-25 DIAGNOSIS — F41.9 MAJOR DEPRESSIVE DISORDER, RECURRENT SEVERE W/OUT PSYCHOTIC FEATURES: ICD-10-CM

## 2025-07-25 DIAGNOSIS — F41.0 GENERALIZED ANXIETY DISORDER: ICD-10-CM

## 2025-07-25 PROCEDURE — 90833 PSYTX W PT W E/M 30 MIN: CPT

## 2025-07-25 PROCEDURE — 99214 OFFICE O/P EST MOD 30 MIN: CPT

## 2025-07-25 RX ORDER — ESCITALOPRAM OXALATE 20 MG/1
20 TABLET ORAL
Qty: 90 | Refills: 1 | Status: ACTIVE | COMMUNITY
Start: 2025-07-25 | End: 1900-01-01

## 2025-07-26 DIAGNOSIS — F51.05 INSOMNIA DUE TO OTHER MENTAL DISORDER: ICD-10-CM

## 2025-07-26 DIAGNOSIS — F60.9 PERSONALITY DISORDER, UNSPECIFIED: ICD-10-CM

## 2025-07-26 DIAGNOSIS — F41.1 GENERALIZED ANXIETY DISORDER: ICD-10-CM

## 2025-07-26 DIAGNOSIS — F33.2 MAJOR DEPRESSIVE DISORDER, RECURRENT SEVERE WITHOUT PSYCHOTIC FEATURES: ICD-10-CM
